# Patient Record
Sex: MALE | Race: WHITE | Employment: UNEMPLOYED | URBAN - METROPOLITAN AREA
[De-identification: names, ages, dates, MRNs, and addresses within clinical notes are randomized per-mention and may not be internally consistent; named-entity substitution may affect disease eponyms.]

---

## 2017-01-03 PROCEDURE — 86803 HEPATITIS C AB TEST: CPT | Performed by: INTERNAL MEDICINE

## 2017-04-03 PROBLEM — F41.0 PANIC ATTACKS: Status: ACTIVE | Noted: 2017-04-03

## 2017-05-15 PROBLEM — J32.4 CHRONIC PANSINUSITIS: Status: ACTIVE | Noted: 2017-05-15

## 2017-06-06 PROCEDURE — 88304 TISSUE EXAM BY PATHOLOGIST: CPT | Performed by: OTOLARYNGOLOGY

## 2017-06-06 PROCEDURE — 88311 DECALCIFY TISSUE: CPT | Performed by: OTOLARYNGOLOGY

## 2017-06-20 PROBLEM — R10.13 DYSPEPSIA: Status: ACTIVE | Noted: 2017-06-20

## 2017-06-20 PROBLEM — N41.0 ACUTE PROSTATITIS: Status: ACTIVE | Noted: 2017-06-20

## 2017-06-20 PROCEDURE — 87086 URINE CULTURE/COLONY COUNT: CPT | Performed by: INTERNAL MEDICINE

## 2017-06-20 PROCEDURE — 83516 IMMUNOASSAY NONANTIBODY: CPT | Performed by: INTERNAL MEDICINE

## 2017-06-23 PROCEDURE — 87338 HPYLORI STOOL AG IA: CPT | Performed by: INTERNAL MEDICINE

## 2017-07-07 PROBLEM — N26.1 ATROPHY OF LEFT KIDNEY: Status: ACTIVE | Noted: 2017-07-07

## 2017-07-07 PROBLEM — N42.9 ABNORMALITY DETECTED ON RECTAL EXAMINATION OF PROSTATE: Status: ACTIVE | Noted: 2017-07-07

## 2017-07-07 PROBLEM — N28.82 URETERAL DILATATION: Status: ACTIVE | Noted: 2017-07-07

## 2017-10-12 PROCEDURE — 88312 SPECIAL STAINS GROUP 1: CPT | Performed by: INTERNAL MEDICINE

## 2017-10-12 PROCEDURE — 88305 TISSUE EXAM BY PATHOLOGIST: CPT | Performed by: INTERNAL MEDICINE

## 2017-10-28 PROCEDURE — 82941 ASSAY OF GASTRIN: CPT | Performed by: INTERNAL MEDICINE

## 2018-04-12 PROBLEM — N41.0 ACUTE PROSTATITIS: Status: RESOLVED | Noted: 2017-06-20 | Resolved: 2018-04-12

## 2018-04-12 PROBLEM — F43.21 GRIEF REACTION: Status: ACTIVE | Noted: 2018-04-12

## 2018-04-12 PROBLEM — F32.9 REACTIVE DEPRESSION: Status: ACTIVE | Noted: 2018-04-12

## 2018-06-18 ENCOUNTER — OFFICE VISIT (OUTPATIENT)
Dept: FAMILY MEDICINE CLINIC | Facility: CLINIC | Age: 43
End: 2018-06-18

## 2018-06-18 VITALS
BODY MASS INDEX: 27.34 KG/M2 | DIASTOLIC BLOOD PRESSURE: 66 MMHG | RESPIRATION RATE: 20 BRPM | HEART RATE: 83 BPM | TEMPERATURE: 98 F | SYSTOLIC BLOOD PRESSURE: 124 MMHG | WEIGHT: 213 LBS | OXYGEN SATURATION: 98 % | HEIGHT: 74 IN

## 2018-06-18 DIAGNOSIS — J06.9 VIRAL URI WITH COUGH: Primary | ICD-10-CM

## 2018-06-18 PROCEDURE — 99202 OFFICE O/P NEW SF 15 MIN: CPT | Performed by: NURSE PRACTITIONER

## 2018-06-18 NOTE — PATIENT INSTRUCTIONS
Humidifier in room  Sleep propped  Push fluids  Limit dairy  Mucinex as directed  Sudafed as needed  Follow up in 5-7 days for worsening symptoms or no improvement    Viral Upper Respiratory Illness (Adult)  You have a viral upper respiratory illness (UR · Over-the-counter cold medicines will not shorten the length of time you’re sick, but they may be helpful for the following symptoms: cough, sore throat, and nasal and sinus congestion.  (Note: Do not use decongestants if you have high blood pressure.)  Fo

## 2018-06-18 NOTE — PROGRESS NOTES
CHIEF COMPLAINT:   Patient presents with:  Fever: 103 highest s/s for 1 day. OTC meds taken  Runny Nose  Body ache and/or chills      HPI:   Justin Montaño is a 37year old male who presents for upper respiratory symptoms for  2 days.  Patient reports so GENERAL: well developed, well nourished,in no apparent distress  SKIN: no rashes,no suspicious lesions  HEAD: atraumatic, normocephalic.  no tenderness on palpation of maxillary sinuses  EYES: conjunctiva clear, EOM intact  EARS: TM's without erythema, no You have a viral upper respiratory illness (URI), which is another term for the common cold. This illness is contagious during the first few days. It is spread through the air by coughing and sneezing.  It may also be spread by direct contact (touching the · Cough with lots of colored sputum (mucus)  · Severe headache; face, neck, or ear pain  · Difficulty swallowing due to throat pain  · Fever of 100.4°F (38°C) or higher, or as directed by your healthcare provider  Call 911  Call 911 if any of these occur:

## 2018-10-21 ENCOUNTER — HOSPITAL ENCOUNTER (EMERGENCY)
Facility: HOSPITAL | Age: 43
Discharge: HOME | End: 2018-10-21
Attending: EMERGENCY MEDICINE
Payer: COMMERCIAL

## 2018-10-21 ENCOUNTER — APPOINTMENT (OUTPATIENT)
Dept: GENERAL RADIOLOGY | Facility: HOSPITAL | Age: 43
End: 2018-10-21
Attending: EMERGENCY MEDICINE
Payer: COMMERCIAL

## 2018-10-21 VITALS
WEIGHT: 210 LBS | BODY MASS INDEX: 26.95 KG/M2 | OXYGEN SATURATION: 97 % | DIASTOLIC BLOOD PRESSURE: 67 MMHG | RESPIRATION RATE: 16 BRPM | SYSTOLIC BLOOD PRESSURE: 129 MMHG | HEART RATE: 86 BPM | HEIGHT: 74 IN | TEMPERATURE: 100 F

## 2018-10-21 DIAGNOSIS — J18.9 COMMUNITY ACQUIRED PNEUMONIA, UNSPECIFIED LATERALITY: Primary | ICD-10-CM

## 2018-10-21 PROCEDURE — 99284 EMERGENCY DEPT VISIT MOD MDM: CPT

## 2018-10-21 PROCEDURE — 80053 COMPREHEN METABOLIC PANEL: CPT | Performed by: EMERGENCY MEDICINE

## 2018-10-21 PROCEDURE — 85025 COMPLETE CBC W/AUTO DIFF WBC: CPT | Performed by: EMERGENCY MEDICINE

## 2018-10-21 PROCEDURE — 96361 HYDRATE IV INFUSION ADD-ON: CPT

## 2018-10-21 PROCEDURE — 71046 X-RAY EXAM CHEST 2 VIEWS: CPT | Performed by: EMERGENCY MEDICINE

## 2018-10-21 PROCEDURE — 96374 THER/PROPH/DIAG INJ IV PUSH: CPT

## 2018-10-21 RX ORDER — DOXYCYCLINE HYCLATE 100 MG/1
100 CAPSULE ORAL 2 TIMES DAILY
Qty: 14 CAPSULE | Refills: 0 | Status: SHIPPED | OUTPATIENT
Start: 2018-10-21 | End: 2018-10-28

## 2018-10-21 RX ORDER — KETOROLAC TROMETHAMINE 30 MG/ML
30 INJECTION, SOLUTION INTRAMUSCULAR; INTRAVENOUS ONCE
Status: COMPLETED | OUTPATIENT
Start: 2018-10-21 | End: 2018-10-21

## 2018-10-21 NOTE — ED PROVIDER NOTES
Patient Seen in: [facility] Emergency Department    History   Patient presents with:  Dyspnea JORGE SOB (respiratory)  Headache (neurologic)    Stated Complaint: jorge, migraine    HPI    24-year-old male presents with cough, congestion, headache, gener GUIDANCE, SINUS ENDOSCOPY ANTROSTOMIES Bilateral 6/6/2017    Performed by Todd Arellano MD at [address] [place] [address]           Social History    Tobacco Use      Smoking status: Former Smoker        Types: Cigarettes        Quit date: 1/6/2017        Year DIFFERENTIAL WITH PLATELET.   Procedure                               Abnormality         Status                     ---------                               -----------         ------                     CBC W/ DIFFERENTIAL[[id number]]          Abnormal days.  To return to the emergency room with worsening symptoms or with any concerns            Disposition and Plan     Clinical Impression:  Community acquired pneumonia, unspecified laterality  (primary encounter diagnosis)    Disposition:  Discharge  10

## 2018-10-21 NOTE — ED INITIAL ASSESSMENT (HPI)
Pt here with c/o fever, headache, dyspnea X 3 days, progressively worsening with nausea/vomiting. Denies any diarrhea. Patient breathing unlabored, even, no acute respiratory distress noted.  Patient reports decreased PO intake over the last few days with n

## 2019-04-30 PROBLEM — N42.9 ABNORMALITY DETECTED ON RECTAL EXAMINATION OF PROSTATE: Status: RESOLVED | Noted: 2017-07-07 | Resolved: 2019-04-30

## 2019-06-03 PROCEDURE — 88305 TISSUE EXAM BY PATHOLOGIST: CPT | Performed by: INTERNAL MEDICINE

## 2019-06-03 PROCEDURE — 88342 IMHCHEM/IMCYTCHM 1ST ANTB: CPT | Performed by: INTERNAL MEDICINE

## 2019-06-04 PROCEDURE — 82941 ASSAY OF GASTRIN: CPT | Performed by: INTERNAL MEDICINE

## 2019-06-04 PROCEDURE — 36415 COLL VENOUS BLD VENIPUNCTURE: CPT | Performed by: INTERNAL MEDICINE

## 2020-03-20 PROBLEM — J20.9 ACUTE BRONCHITIS, UNSPECIFIED ORGANISM: Status: ACTIVE | Noted: 2020-03-20

## 2020-11-19 PROBLEM — J20.9 ACUTE BRONCHITIS, UNSPECIFIED ORGANISM: Status: RESOLVED | Noted: 2020-03-20 | Resolved: 2020-11-19

## 2020-11-19 PROBLEM — M94.0 COSTOCHONDRITIS, ACUTE: Status: ACTIVE | Noted: 2020-11-19

## 2022-11-14 ENCOUNTER — TELEPHONE (OUTPATIENT)
Dept: HEMATOLOGY/ONCOLOGY | Age: 47
End: 2022-11-14

## 2022-11-14 NOTE — TELEPHONE ENCOUNTER
Patient called unwilling to give any information and stated that it was personal and none of my business, just have the Dr.s call me very loudly. This message is for Dr. Imelda Velasco and Dr. Melvina Mitchell.

## 2023-09-02 ENCOUNTER — HOSPITAL ENCOUNTER (EMERGENCY)
Age: 48
Discharge: HOME | End: 2023-09-02
Attending: EMERGENCY MEDICINE
Payer: COMMERCIAL

## 2023-09-02 VITALS
DIASTOLIC BLOOD PRESSURE: 77 MMHG | RESPIRATION RATE: 17 BRPM | OXYGEN SATURATION: 99 % | TEMPERATURE: 98 F | HEIGHT: 74 IN | WEIGHT: 220 LBS | BODY MASS INDEX: 28.23 KG/M2 | SYSTOLIC BLOOD PRESSURE: 136 MMHG | HEART RATE: 79 BPM

## 2023-09-02 DIAGNOSIS — T65.91XA INGESTION OF HYDROCARBON, ACCIDENTAL OR UNINTENTIONAL, INITIAL ENCOUNTER: Primary | ICD-10-CM

## 2023-09-02 LAB
ALBUMIN SERPL-MCNC: 3.8 G/DL (ref 3.4–5)
ALBUMIN SERPL-MCNC: 4.4 G/DL (ref 3.4–5)
ALBUMIN/GLOB SERPL: 1.3 {RATIO} (ref 1–2)
ALBUMIN/GLOB SERPL: 1.3 {RATIO} (ref 1–2)
ALP LIVER SERPL-CCNC: 87 U/L
ALP LIVER SERPL-CCNC: 94 U/L
ALT SERPL-CCNC: 39 U/L
ALT SERPL-CCNC: 43 U/L
ANION GAP SERPL CALC-SCNC: 5 MMOL/L (ref 0–18)
ANION GAP SERPL CALC-SCNC: 7 MMOL/L (ref 0–18)
AST SERPL-CCNC: 24 U/L (ref 15–37)
AST SERPL-CCNC: 28 U/L (ref 15–37)
BASOPHILS # BLD AUTO: 0.05 X10(3) UL (ref 0–0.2)
BASOPHILS NFR BLD AUTO: 0.6 %
BILIRUB SERPL-MCNC: 0.4 MG/DL (ref 0.1–2)
BILIRUB SERPL-MCNC: 0.4 MG/DL (ref 0.1–2)
BUN BLD-MCNC: 18 MG/DL (ref 7–18)
BUN BLD-MCNC: 21 MG/DL (ref 7–18)
CALCIUM BLD-MCNC: 8.8 MG/DL (ref 8.5–10.1)
CALCIUM BLD-MCNC: 9.4 MG/DL (ref 8.5–10.1)
CHLORIDE SERPL-SCNC: 106 MMOL/L (ref 98–112)
CHLORIDE SERPL-SCNC: 106 MMOL/L (ref 98–112)
CO2 SERPL-SCNC: 24 MMOL/L (ref 21–32)
CO2 SERPL-SCNC: 27 MMOL/L (ref 21–32)
CREAT BLD-MCNC: 0.89 MG/DL
CREAT BLD-MCNC: 0.98 MG/DL
EGFRCR SERPLBLD CKD-EPI 2021: 104 ML/MIN/1.73M2 (ref 60–?)
EGFRCR SERPLBLD CKD-EPI 2021: 94 ML/MIN/1.73M2 (ref 60–?)
EOSINOPHIL # BLD AUTO: 0.19 X10(3) UL (ref 0–0.7)
EOSINOPHIL NFR BLD AUTO: 2.5 %
ERYTHROCYTE [DISTWIDTH] IN BLOOD BY AUTOMATED COUNT: 12.2 %
GLOBULIN PLAS-MCNC: 3 G/DL (ref 2.8–4.4)
GLOBULIN PLAS-MCNC: 3.3 G/DL (ref 2.8–4.4)
GLUCOSE BLD-MCNC: 100 MG/DL (ref 70–99)
GLUCOSE BLD-MCNC: 164 MG/DL (ref 70–99)
HCT VFR BLD AUTO: 41.1 %
HGB BLD-MCNC: 14.3 G/DL
IMM GRANULOCYTES # BLD AUTO: 0.02 X10(3) UL (ref 0–1)
IMM GRANULOCYTES NFR BLD: 0.3 %
LYMPHOCYTES # BLD AUTO: 2.06 X10(3) UL (ref 1–4)
LYMPHOCYTES NFR BLD AUTO: 26.7 %
MCH RBC QN AUTO: 31 PG (ref 26–34)
MCHC RBC AUTO-ENTMCNC: 34.8 G/DL (ref 31–37)
MCV RBC AUTO: 89 FL
MONOCYTES # BLD AUTO: 0.68 X10(3) UL (ref 0.1–1)
MONOCYTES NFR BLD AUTO: 8.8 %
NEUTROPHILS # BLD AUTO: 4.72 X10 (3) UL (ref 1.5–7.7)
NEUTROPHILS # BLD AUTO: 4.72 X10(3) UL (ref 1.5–7.7)
NEUTROPHILS NFR BLD AUTO: 61.1 %
OSMOLALITY SERPL CALC.SUM OF ELEC: 287 MOSM/KG (ref 275–295)
OSMOLALITY SERPL CALC.SUM OF ELEC: 292 MOSM/KG (ref 275–295)
PLATELET # BLD AUTO: 347 10(3)UL (ref 150–450)
POTASSIUM SERPL-SCNC: 3.6 MMOL/L (ref 3.5–5.1)
POTASSIUM SERPL-SCNC: 4.2 MMOL/L (ref 3.5–5.1)
PROT SERPL-MCNC: 6.8 G/DL (ref 6.4–8.2)
PROT SERPL-MCNC: 7.7 G/DL (ref 6.4–8.2)
RBC # BLD AUTO: 4.62 X10(6)UL
SODIUM SERPL-SCNC: 137 MMOL/L (ref 136–145)
SODIUM SERPL-SCNC: 138 MMOL/L (ref 136–145)
WBC # BLD AUTO: 7.7 X10(3) UL (ref 4–11)

## 2023-09-02 PROCEDURE — 80053 COMPREHEN METABOLIC PANEL: CPT | Performed by: EMERGENCY MEDICINE

## 2023-09-02 PROCEDURE — 93010 ELECTROCARDIOGRAM REPORT: CPT

## 2023-09-02 PROCEDURE — 36415 COLL VENOUS BLD VENIPUNCTURE: CPT

## 2023-09-02 PROCEDURE — 85025 COMPLETE CBC W/AUTO DIFF WBC: CPT | Performed by: EMERGENCY MEDICINE

## 2023-09-02 PROCEDURE — 99283 EMERGENCY DEPT VISIT LOW MDM: CPT

## 2023-09-02 PROCEDURE — 99285 EMERGENCY DEPT VISIT HI MDM: CPT

## 2023-09-02 NOTE — ED QUICK NOTES
Poison control contacted at this time - monitor renal, liver systems and acidosis. CMP now then 5-6 hours.  Monitor for worsening AMS, nausea, dizziness, chest CO level 5-6 hours  Case # [id number] - Art
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
## 2024-06-21 ENCOUNTER — HOSPITAL ENCOUNTER (EMERGENCY)
Age: 49
Discharge: HOME | End: 2024-06-21
Attending: EMERGENCY MEDICINE

## 2024-06-21 VITALS
WEIGHT: 200 LBS | HEART RATE: 111 BPM | RESPIRATION RATE: 18 BRPM | OXYGEN SATURATION: 96 % | TEMPERATURE: 100 F | BODY MASS INDEX: 25.67 KG/M2 | HEIGHT: 74 IN | SYSTOLIC BLOOD PRESSURE: 150 MMHG | DIASTOLIC BLOOD PRESSURE: 80 MMHG

## 2024-06-21 DIAGNOSIS — T20.20XA PARTIAL THICKNESS BURN OF FACE, INITIAL ENCOUNTER: ICD-10-CM

## 2024-06-21 DIAGNOSIS — T30.0 FIRST DEGREE BURN INJURY: Primary | ICD-10-CM

## 2024-06-21 PROCEDURE — 90471 IMMUNIZATION ADMIN: CPT

## 2024-06-21 PROCEDURE — 99284 EMERGENCY DEPT VISIT MOD MDM: CPT

## 2024-06-21 PROCEDURE — 99283 EMERGENCY DEPT VISIT LOW MDM: CPT

## 2024-06-21 RX ORDER — HYDROCODONE BITARTRATE AND ACETAMINOPHEN 7.5; 325 MG/1; MG/1
1-2 TABLET ORAL EVERY 6 HOURS PRN
Qty: 15 TABLET | Refills: 0 | Status: SHIPPED | OUTPATIENT
Start: 2024-06-21

## 2024-06-21 RX ORDER — HYDROCODONE BITARTRATE AND ACETAMINOPHEN 5; 325 MG/1; MG/1
2 TABLET ORAL ONCE
Status: COMPLETED | OUTPATIENT
Start: 2024-06-21 | End: 2024-06-21

## 2024-06-21 RX ORDER — IBUPROFEN 600 MG/1
600 TABLET ORAL ONCE
Status: COMPLETED | OUTPATIENT
Start: 2024-06-21 | End: 2024-06-21
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
## 2024-12-23 ENCOUNTER — HOSPITAL ENCOUNTER (EMERGENCY)
Age: 49
Discharge: HOME | End: 2024-12-24
Attending: EMERGENCY MEDICINE
Payer: COMMERCIAL

## 2024-12-23 DIAGNOSIS — R11.2 NAUSEA AND VOMITING IN ADULT: Primary | ICD-10-CM

## 2024-12-23 DIAGNOSIS — R19.7 DIARRHEA, UNSPECIFIED TYPE: ICD-10-CM

## 2024-12-23 DIAGNOSIS — R10.9 ABDOMINAL PAIN, ACUTE: ICD-10-CM

## 2024-12-23 LAB
POCT INFLUENZA A: NEGATIVE
POCT INFLUENZA B: NEGATIVE
SARS-COV-2 RNA RESP QL NAA+PROBE: NOT DETECTED

## 2024-12-23 PROCEDURE — 96372 THER/PROPH/DIAG INJ SC/IM: CPT

## 2024-12-23 PROCEDURE — 99284 EMERGENCY DEPT VISIT MOD MDM: CPT

## 2024-12-23 PROCEDURE — 93005 ELECTROCARDIOGRAM TRACING: CPT

## 2024-12-23 PROCEDURE — 87502 INFLUENZA DNA AMP PROBE: CPT

## 2024-12-23 PROCEDURE — 87502 INFLUENZA DNA AMP PROBE: CPT | Performed by: EMERGENCY MEDICINE

## 2024-12-23 PROCEDURE — 96374 THER/PROPH/DIAG INJ IV PUSH: CPT

## 2024-12-23 PROCEDURE — 93010 ELECTROCARDIOGRAM REPORT: CPT

## 2024-12-23 RX ORDER — ONDANSETRON 2 MG/ML
4 INJECTION INTRAMUSCULAR; INTRAVENOUS ONCE
Status: DISCONTINUED | OUTPATIENT
Start: 2024-12-23 | End: 2024-12-23

## 2024-12-23 RX ORDER — DICYCLOMINE HYDROCHLORIDE 10 MG/ML
10 INJECTION INTRAMUSCULAR ONCE
Status: COMPLETED | OUTPATIENT
Start: 2024-12-23 | End: 2024-12-23

## 2024-12-23 RX ORDER — LOPERAMIDE HYDROCHLORIDE 2 MG/1
4 CAPSULE ORAL ONCE
Status: COMPLETED | OUTPATIENT
Start: 2024-12-23 | End: 2024-12-24

## 2024-12-23 RX ORDER — KETOROLAC TROMETHAMINE 15 MG/ML
15 INJECTION, SOLUTION INTRAMUSCULAR; INTRAVENOUS ONCE
Status: COMPLETED | OUTPATIENT
Start: 2024-12-23 | End: 2024-12-24

## 2024-12-24 VITALS
WEIGHT: 200 LBS | SYSTOLIC BLOOD PRESSURE: 114 MMHG | DIASTOLIC BLOOD PRESSURE: 63 MMHG | HEIGHT: 74 IN | OXYGEN SATURATION: 97 % | BODY MASS INDEX: 25.67 KG/M2 | HEART RATE: 90 BPM | RESPIRATION RATE: 16 BRPM | TEMPERATURE: 99 F

## 2024-12-24 LAB
ANION GAP SERPL CALC-SCNC: 7 MMOL/L (ref 0–18)
ATRIAL RATE: 109 BPM
BASOPHILS # BLD AUTO: 0.03 X10(3) UL (ref 0–0.2)
BASOPHILS NFR BLD AUTO: 0.2 %
BUN BLD-MCNC: 26 MG/DL (ref 9–23)
CALCIUM BLD-MCNC: 9.8 MG/DL (ref 8.7–10.4)
CHLORIDE SERPL-SCNC: 105 MMOL/L (ref 98–112)
CO2 SERPL-SCNC: 24 MMOL/L (ref 21–32)
CREAT BLD-MCNC: 0.97 MG/DL
EGFRCR SERPLBLD CKD-EPI 2021: 95 ML/MIN/1.73M2 (ref 60–?)
EOSINOPHIL # BLD AUTO: 0.08 X10(3) UL (ref 0–0.7)
EOSINOPHIL NFR BLD AUTO: 0.6 %
ERYTHROCYTE [DISTWIDTH] IN BLOOD BY AUTOMATED COUNT: 12.4 %
GLUCOSE BLD-MCNC: 156 MG/DL (ref 70–99)
HCT VFR BLD AUTO: 47.7 %
HGB BLD-MCNC: 16.8 G/DL
IMM GRANULOCYTES # BLD AUTO: 0.04 X10(3) UL (ref 0–1)
IMM GRANULOCYTES NFR BLD: 0.3 %
LYMPHOCYTES # BLD AUTO: 0.15 X10(3) UL (ref 1–4)
LYMPHOCYTES NFR BLD AUTO: 1.1 %
MCH RBC QN AUTO: 31.8 PG (ref 26–34)
MCHC RBC AUTO-ENTMCNC: 35.2 G/DL (ref 31–37)
MCV RBC AUTO: 90.3 FL
MONOCYTES # BLD AUTO: 0.63 X10(3) UL (ref 0.1–1)
MONOCYTES NFR BLD AUTO: 4.7 %
NEUTROPHILS # BLD AUTO: 12.46 X10 (3) UL (ref 1.5–7.7)
NEUTROPHILS # BLD AUTO: 12.46 X10(3) UL (ref 1.5–7.7)
NEUTROPHILS NFR BLD AUTO: 93.1 %
OSMOLALITY SERPL CALC.SUM OF ELEC: 290 MOSM/KG (ref 275–295)
P AXIS: 66 DEGREES
P-R INTERVAL: 126 MS
PLATELET # BLD AUTO: 323 10(3)UL (ref 150–450)
POTASSIUM SERPL-SCNC: 4.1 MMOL/L (ref 3.5–5.1)
Q-T INTERVAL: 340 MS
QRS DURATION: 90 MS
QTC CALCULATION (BEZET): 457 MS
R AXIS: 84 DEGREES
RBC # BLD AUTO: 5.28 X10(6)UL
SODIUM SERPL-SCNC: 136 MMOL/L (ref 136–145)
T AXIS: 36 DEGREES
VENTRICULAR RATE: 109 BPM
WBC # BLD AUTO: 13.4 X10(3) UL (ref 4–11)

## 2024-12-24 PROCEDURE — 80048 BASIC METABOLIC PNL TOTAL CA: CPT | Performed by: EMERGENCY MEDICINE

## 2024-12-24 PROCEDURE — 85025 COMPLETE CBC W/AUTO DIFF WBC: CPT | Performed by: EMERGENCY MEDICINE

## 2024-12-24 RX ORDER — DICYCLOMINE HCL 20 MG
20 TABLET ORAL 4 TIMES DAILY PRN
Qty: 30 TABLET | Refills: 0 | Status: SHIPPED | OUTPATIENT
Start: 2024-12-24 | End: 2025-01-23

## 2024-12-24 RX ORDER — ONDANSETRON 4 MG/1
4 TABLET, ORALLY DISINTEGRATING ORAL EVERY 4 HOURS PRN
Qty: 10 TABLET | Refills: 0 | Status: SHIPPED | OUTPATIENT
Start: 2024-12-24 | End: 2024-12-31
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
## 2024-12-24 NOTE — ED PROVIDER NOTES
Patient Seen in: [facility] [place]      History     Chief Complaint   Patient presents with    Nausea/Vomiting/Diarrhea     Stated Complaint: vomitting, diarrhea    Subjective:   HPI      49-year-old male past medical history of seizure disorder presents ED with complaints nausea and vomiting and diarrhea.  Patient reports symptoms began since 1730.  His son has similar symptoms and continues to have nausea and vomiting.  Patient reporting abdominal cramping and pain.  Has taken 3 doses of 4 mg ODT ondansetron and just feels improved from his last dose.  Denies any fever positive chills complaining abdominal pain and cramping.  Complaining of profuse watery diarrhea no black or bloody stools.  Objective:     Past Medical History:    Anxiety    Chronic pansinusitis    Seizure disorder (HCC)    Seizures (HCC)              Past Surgical History:   Procedure Laterality Date    Colonoscopy N/A 6/3/2019    Procedure: COLONOSCOPY, POSSIBLE BIOPSY, POSSIBLE POLYPECTOMY 04532;  Surgeon: Salvador Shelby MD;  Location: [facility]    Egd  10/12/2017    esophagitis, erosive gastritis    Hand/finger surgery unlisted                  Social History     Socioeconomic History    Marital status:    Tobacco Use    Smoking status: Former     Current packs/day: 0.00     Types: Cigarettes     Quit date: 2017     Years since quittin.9    Smokeless tobacco: Never   Substance and Sexual Activity    Alcohol use: Yes     Comment: couple beers daily    Drug use: No     Social Drivers of Health      [facility], [facility]    Social Connections    Received from [facility], [facility]    Housing Stability                  Physical Exam     ED Triage Vitals [24 2130]   /73   Pulse (!) 127   Resp 20   Temp 98.2 °F (36.8 °C)   Temp src Oral   SpO2 95 %   O2 Device None (Room air)        Current Vitals:   Vital Signs  BP: 114/63  Pulse: 90  Resp: 16  Temp: 99 °F (37.2 °C)  Temp src: Oral    Oxygen Therapy  SpO2: 97 %  O2 Device: None (Room air)        Physical Exam  Vitals and nursing note reviewed.   Constitutional:       Appearance: He is well-developed.   HENT:      Head: Normocephalic and atraumatic.   Eyes:      Pupils: Pupils are equal, round, and reactive to light.   Cardiovascular:      Rate and Rhythm: Normal rate and regular rhythm.   Pulmonary:      Effort: Pulmonary effort is normal.      Breath sounds: Normal breath sounds.   Abdominal:      General: Bowel sounds are normal.      Palpations: Abdomen is soft.   Musculoskeletal:         General: No deformity.      Cervical back: Normal range of motion and neck supple.   Skin:     General: Skin is warm and dry.      Capillary Refill: Capillary refill takes less than 2 seconds.   Neurological:      Mental Status: He is alert and oriented to person, place, and time.             ED Course     Labs Reviewed   BASIC METABOLIC PANEL (8) - Abnormal; Notable for the following components:       Result Value    Glucose 156 (*)     BUN 26 (*)     All other components within normal limits   CBC WITH DIFFERENTIAL WITH PLATELET - Abnormal; Notable for the following components:    WBC 13.4 (*)     Neutrophil Absolute Prelim 12.46 (*)     Neutrophil Absolute 12.46 (*)     Lymphocyte Absolute 0.15 (*)     All other components within normal limits   RAPID SARS-COV-2 BY PCR - Normal   POCT FLU TEST - Normal    Narrative:     This assay is a rapid molecular in vitro test utilizing nucleic acid amplification of influenza A and B viral RNA.     EKG    Rate, intervals and axes as noted on EKG Report.  Rate: 109  Rhythm: Sinus Rhythm  Reading: No gross ST elevations or depressions QTc 457 within normal limits                       MDM      This is a 49-year-old male who presents He with complaints of nausea vomiting diarrhea.  Patient mildly tachycardic on  arrival he appears nontoxic examination mildly pale on exam abdomen minimal tenderness epigastric region no rebound guarding or rigidity.  Will place IV give him IV fluids obtain basic lab work.  IV fluids given patient tolerating p.o. well now labs reviewed mild leukocytosis.  Bentyl given for abdominal pain.  Patient admits to be significantly improved at this time tolerating p.o. at this time.  Discussed patient that he should go home and take his Dilantin as prescribed.  Discussed continue taking ondansetron 4 mg every 4-8 hours as needed for nausea and vomiting Bentyl for abdominal pain and cramping Tylenol Motrin for abdominal pain and cramping.  Close follow-up recommended strict return precaution instructed.  Also discussed Imodium for diarrhea.  Patient shows understanding of plan and is in agreement plan discharged home in stable condition.  Of note his 22-year-old son is driving him home.        Medical Decision Making      Disposition and Plan     Clinical Impression:  1. Nausea and vomiting in adult    2. Abdominal pain, acute    3. Diarrhea, unspecified type         Disposition:  Discharge  12/24/2024  1:11 am    Follow-up:  No follow-up provider specified.        Medications Prescribed:  Current Discharge Medication List        START taking these medications    Details   ondansetron 4 MG Oral Tablet Dispersible Take 1 tablet (4 mg total) by mouth every 4 (four) hours as needed for Nausea.  Qty: 10 tablet, Refills: 0      dicyclomine 20 MG Oral Tab Take 1 tablet (20 mg total) by mouth 4 (four) times daily as needed.  Qty: 30 tablet, Refills: 0                 Supplementary Documentation:

## 2024-12-24 NOTE — ED INITIAL ASSESSMENT (HPI)
Vomiting and diarrhea since 1730 today. Took zofran at 2030 tonight. Pt states he feels very hot. Ice pack given in triage.

## 2024-12-24 NOTE — DISCHARGE INSTRUCTIONS
Please take ondansetron 4 mg every 48 hours as needed for nausea and vomiting.  Take dicyclomine 20 mg every 6 hours as needed for abdominal pain and cramping.  He can also take acetaminophen as well as ibuprofen.  Please make sure to take your Dilantin tonight.  Please drink lots of fluids including Gatorade and fluids with  electrolytes.    Return to the ER if your symptoms worsen or progress.  Your flu test and COVID test are negative.

## 2024-12-24 NOTE — ED QUICK NOTES
To ER for multiple episodes of n/v/diarrhea,as nasir the triage note.The pts family was sick with the same things. He has generalized abdominal tenderness.

## (undated) NOTE — LETTER
Date & Time: 6/21/2024, 1:41 PM  Patient: Pepe Montalvo  Encounter Provider(s):    Girma Grider MD Serdiuk, Jennifer, APRN       To Whom It May Concern:    Pepe Montalvo was seen and treated in our department on 6/21/2024. He can return to work 06/28/2024    If you have any questions or concerns, please do not hesitate to call.        _____________________________  Physician/APC Signature

## (undated) NOTE — Clinical Note
Dear Dr. Rhonda Mills,       Thank you for referring Gus Sesay to the [facility].   Sincerely,  DALTON Knox

## (undated) NOTE — ED AVS SNAPSHOT
Osmany Goldstein   MRN: [id number]    Department:  [facility] Emergency Department   Date of Visit:  10/21/2018           Disclosure     Insurance plans vary and the physician(s) referred by the ER may not be covered by your plan.  Please contact y tell this physician (or your personal doctor if your instructions are to return to your personal doctor) about any new or lasting problems. The primary care or specialist physician will see patients referred from the [facility] Emergency Department.  Brittany Art

## (undated) NOTE — LETTER
Date & Time: 10/21/2018, 6:36 PM  Patient: Humble Bolden  Encounter Provider(s):    Vernon Norman MD       To Whom It May Concern:    Reddy Glass was seen and treated in our department on 10/21/2018. Please excuse him from work 10/22/2018.     If